# Patient Record
Sex: MALE | Race: OTHER | NOT HISPANIC OR LATINO | Employment: FULL TIME | URBAN - METROPOLITAN AREA
[De-identification: names, ages, dates, MRNs, and addresses within clinical notes are randomized per-mention and may not be internally consistent; named-entity substitution may affect disease eponyms.]

---

## 2024-07-11 ENCOUNTER — HOSPITAL ENCOUNTER (EMERGENCY)
Facility: HOSPITAL | Age: 29
Discharge: HOME | End: 2024-07-11

## 2024-07-11 ENCOUNTER — HOSPITAL ENCOUNTER (EMERGENCY)
Facility: HOSPITAL | Age: 29
Discharge: HOME | End: 2024-07-12

## 2024-07-11 VITALS
TEMPERATURE: 97.9 F | HEART RATE: 60 BPM | RESPIRATION RATE: 18 BRPM | HEIGHT: 67 IN | WEIGHT: 140 LBS | DIASTOLIC BLOOD PRESSURE: 77 MMHG | SYSTOLIC BLOOD PRESSURE: 158 MMHG | OXYGEN SATURATION: 100 % | BODY MASS INDEX: 21.97 KG/M2

## 2024-07-11 VITALS
BODY MASS INDEX: 21.97 KG/M2 | TEMPERATURE: 97.7 F | WEIGHT: 140 LBS | SYSTOLIC BLOOD PRESSURE: 174 MMHG | HEIGHT: 67 IN | HEART RATE: 74 BPM | RESPIRATION RATE: 16 BRPM | OXYGEN SATURATION: 99 % | DIASTOLIC BLOOD PRESSURE: 112 MMHG

## 2024-07-11 DIAGNOSIS — J02.9 VIRAL PHARYNGITIS: ICD-10-CM

## 2024-07-11 DIAGNOSIS — R03.0 ELEVATED BLOOD PRESSURE READING: ICD-10-CM

## 2024-07-11 DIAGNOSIS — R59.9 LYMPH NODE ENLARGEMENT: Primary | ICD-10-CM

## 2024-07-11 DIAGNOSIS — J02.9 ACUTE PHARYNGITIS, UNSPECIFIED ETIOLOGY: Primary | ICD-10-CM

## 2024-07-11 LAB
HETEROPH AB SERPLBLD QL IA.RAPID: NEGATIVE
S PYO DNA THROAT QL NAA+PROBE: NOT DETECTED

## 2024-07-11 PROCEDURE — 87651 STREP A DNA AMP PROBE: CPT

## 2024-07-11 PROCEDURE — 99283 EMERGENCY DEPT VISIT LOW MDM: CPT

## 2024-07-11 PROCEDURE — 36415 COLL VENOUS BLD VENIPUNCTURE: CPT

## 2024-07-11 PROCEDURE — 86308 HETEROPHILE ANTIBODY SCREEN: CPT

## 2024-07-11 RX ORDER — ACETAMINOPHEN 325 MG/1
650 TABLET ORAL ONCE
Status: COMPLETED | OUTPATIENT
Start: 2024-07-11 | End: 2024-07-11

## 2024-07-11 RX ADMIN — ACETAMINOPHEN 650 MG: 325 TABLET ORAL at 09:55

## 2024-07-11 ASSESSMENT — COLUMBIA-SUICIDE SEVERITY RATING SCALE - C-SSRS
6. HAVE YOU EVER DONE ANYTHING, STARTED TO DO ANYTHING, OR PREPARED TO DO ANYTHING TO END YOUR LIFE?: NO
2. HAVE YOU ACTUALLY HAD ANY THOUGHTS OF KILLING YOURSELF?: NO
1. IN THE PAST MONTH, HAVE YOU WISHED YOU WERE DEAD OR WISHED YOU COULD GO TO SLEEP AND NOT WAKE UP?: NO

## 2024-07-11 ASSESSMENT — PAIN DESCRIPTION - LOCATION: LOCATION: THROAT

## 2024-07-11 ASSESSMENT — PAIN SCALES - GENERAL: PAINLEVEL_OUTOF10: 10 - WORST POSSIBLE PAIN

## 2024-07-11 ASSESSMENT — LIFESTYLE VARIABLES
HAVE PEOPLE ANNOYED YOU BY CRITICIZING YOUR DRINKING: NO
TOTAL SCORE: 0
EVER HAD A DRINK FIRST THING IN THE MORNING TO STEADY YOUR NERVES TO GET RID OF A HANGOVER: NO
HAVE YOU EVER FELT YOU SHOULD CUT DOWN ON YOUR DRINKING: NO
EVER FELT BAD OR GUILTY ABOUT YOUR DRINKING: NO

## 2024-07-11 ASSESSMENT — PAIN DESCRIPTION - PAIN TYPE: TYPE: ACUTE PAIN

## 2024-07-11 ASSESSMENT — PAIN - FUNCTIONAL ASSESSMENT: PAIN_FUNCTIONAL_ASSESSMENT: 0-10

## 2024-07-11 NOTE — ED TRIAGE NOTES
PT COMES TO ER WITH C/O SWOLLEN GLAND IN RIGHT SIDE OF THROAT/NECK. PT STATES PAIN WHEN SWALLOWING. PT STATES MOTRIN HELPS PAIN AND SWELLING AND THEN IT COMES BACK WHEN MED WEARS OFF.

## 2024-07-11 NOTE — ED PROVIDER NOTES
HPI   Chief Complaint   Patient presents with    Swollen Glands       Hope is a 29-year-old male with no pertinent past medical history and up-to-date on childhood vaccinations presenting to the emergency department with concerns for a swollen gland.  Patient notes that several days ago, he believes he found a swollen lymph node on the right side of his neck.  Patient states that he has been feeling the lymph node over the past couple days to assess if the swelling has gone down.  He states that it has increased and would like it to be evaluated today.  This morning, patient did develop a sore throat.  He denies any dysphagia.  No cough, fever, chills, nausea, vomiting.  He denies any sick contacts.  He denies any recent weight loss, night sweats, or bruising.  Patient states that he is otherwise healthy.                        [place] Coma Scale Score: 15                     Patient History   No past medical history on file.  No past surgical history on file.  No family history on file.  Social History     Tobacco Use    Smoking status: Not on file    Smokeless tobacco: Not on file   Substance Use Topics    Alcohol use: Not on file    Drug use: Not on file       Physical Exam   ED Triage Vitals [07/11/24 0912]   Temperature Heart Rate Respirations BP   36.6 °C (97.9 °F) 64 16 (!) 194/104      Pulse Ox Temp Source Heart Rate Source Patient Position   100 % Temporal -- Sitting      BP Location FiO2 (%)     Right arm --       Physical Exam  HENT:      Nose: No rhinorrhea.      Mouth/Throat:      Mouth: Mucous membranes are moist.      Pharynx: Oropharynx is clear. Posterior oropharyngeal erythema present. No oropharyngeal exudate.      Comments: No trismus.  Posterior oropharyngeal erythema.  No exudates.  No uvular deviation.  Airway is patent.  Mallampati 1.  Eyes:      Extraocular Movements: Extraocular movements intact.   Neck:      Comments: On physical examination, there is no clear swelling of the neck.  On  palpation, there is an enlarged lymph node assessed in the right cervical lymph chain.  It is soft, mobile, and nontender.  Cardiovascular:      Rate and Rhythm: Normal rate and regular rhythm.      Pulses: Normal pulses.      Heart sounds: Normal heart sounds.   Pulmonary:      Effort: Pulmonary effort is normal.      Breath sounds: Normal breath sounds.   Abdominal:      General: Abdomen is flat.      Palpations: Abdomen is soft.   Musculoskeletal:      Cervical back: Normal range of motion. No rigidity.   Lymphadenopathy:      Cervical: Cervical adenopathy present.   Neurological:      Mental Status: He is alert and oriented to person, place, and time. Mental status is at baseline.         ED Course & MDM   ED Course as of 07/11/24 1303   Thu Jul 11, 2024   1102 Group A Strep PCR: Not Detected []   1231 Mononucleosis Screen: Negative []      ED Course User Index  [] Era Russell PA-C         Diagnoses as of 07/11/24 1303   Lymph node enlargement   Viral pharyngitis   Elevated blood pressure reading       Medical Decision Making  Panfilo is a 29-year-old male with no pertinent past medical history and up-to-date on childhood vaccinations presenting to the emergency department with concerns for a swollen gland.    Medical Decision Making: He did not appear ill or toxic.  Vital signs reviewed and stable.  Blood pressure is slightly elevated at 194/104, patient is denying any headache, dizziness, lightheadedness, chest pain, or shortness of breath.  No fever.  There is no swelling of the neck on visual examination.  On palpation there is enlarged lymph noticed this in the right, cervical lymph chain that is soft, mobile, and nontender.  Workup in the emergency room included PCR swab for strep and mononucleosis screen.  I did offer CT of the soft tissue neck to patient, but he does not want to pursue imaging at this time.  I did consider malignancy, but I have low suspicion as patient endorses no recent weight  loss, night sweats, or history of malignancy.  I suspect that his symptoms today are related to reactive lymphadenopathy.  Patient notes that he has been touching the area multiple times a day to keep reassessing it.  This is likely causing the area to be reactive and swollen.  I did recommend he follow-up with a primary care provider regarding his visit to the ED today.  He should discuss the swollen lymph node and his elevated blood pressure reading.  Instructed him to return if he develops any chest pain, shortness of breath, weight loss, night sweats, or worsening sore throat.  I went to go provide patient with discharge paperwork and he was not in the room.  There are no belongings there.  He is not in the bathroom.  Ultimately, patient left with treatment incomplete.     Differential diagnoses considered: Strep pharyngitis, viral pharyngitis, mononucleosis, reactive lymphadenopathy, etc.  Malignancy and peritonsillar abscess were considered in differentials.  Low suspicion for malignancy as patient does not have any weight loss, night sweats, or history concerning for such process.  Additionally, he is accompanied sore throat which is reassuring.  Suspicion for PTA low as there is no uvular deviation, trismus, or exudates.     Medications given: Tylenol     Diagnosis: Lymph node enlargement, viral pharyngitis, elevated blood pressure reading    Plan: Left with treatment incomplete          Procedure  Procedures     Era Russell PA-C  07/11/24 6381

## 2024-07-11 NOTE — Clinical Note
Panfilo Humphrey was seen and treated in our emergency department on 7/11/2024.  He may return to work on 07/13/2024.       If you have any questions or concerns, please don't hesitate to call.      Era Russell PA-C

## 2024-07-11 NOTE — DISCHARGE INSTRUCTIONS
You are seen emergency room today for concerns of enlarged lymph node and a sore throat.  We performed a PCR swab for strep which was negative.  We also did a monoscreen which was negative.  At this time, I believe that your symptoms may be due to a viral pharyngitis and/or reactive lymph node enlargement from you touching it frequently.  I recommend that you follow-up with a primary care provider regarding your visit to the ED today.  I provided you a referral to one.  You should return to the emergency department if you have any chest pain, shortness of breath, sudden weight loss, night sweats, or abdominal pain.  For your viral pharyngitis, I recommend supportive care including alternating Tylenol/ibuprofen, rest, hydration, hot tea with honey, and if you would like you can use over-the-counter Chloraseptic spray.

## 2024-07-12 PROCEDURE — 2500000001 HC RX 250 WO HCPCS SELF ADMINISTERED DRUGS (ALT 637 FOR MEDICARE OP): Performed by: PHYSICIAN ASSISTANT

## 2024-07-12 PROCEDURE — 2500000004 HC RX 250 GENERAL PHARMACY W/ HCPCS (ALT 636 FOR OP/ED): Performed by: PHYSICIAN ASSISTANT

## 2024-07-12 RX ORDER — AMOXICILLIN 500 MG/1
1000 CAPSULE ORAL EVERY 8 HOURS SCHEDULED
Status: DISCONTINUED | OUTPATIENT
Start: 2024-07-12 | End: 2024-07-12 | Stop reason: HOSPADM

## 2024-07-12 RX ORDER — AMOXICILLIN 500 MG/1
1000 CAPSULE ORAL EVERY 12 HOURS SCHEDULED
Qty: 40 CAPSULE | Refills: 0 | Status: SHIPPED | OUTPATIENT
Start: 2024-07-12 | End: 2024-07-22

## 2024-07-12 NOTE — ED TRIAGE NOTES
Patient was here earlier for throat pain for several days. Stated today it had gotten worse, was being evaluated earlier but had familial obligations and had to leave. Patient still having significant pain

## 2024-07-12 NOTE — DISCHARGE INSTRUCTIONS
Your blood pressure was found to be elevated.  This may be related to your discomfort however untreated high blood pressure could potentially result in heart or kidney disease.  You should closely monitor your blood pressure and follow-up accordingly with your care team

## 2024-07-12 NOTE — ED PROVIDER NOTES
HPI   Chief Complaint   Patient presents with    Sore Throat     Patient was here earlier for throat pain for several days. Stated today it had gotten worse, was being evaluated earlier but had familial obligations and had to leave. Patient still having significant pain       29-year-old male presents complaining of a sore throat.  The patient states discomfort for the past few days.  He was seen and evaluated earlier today at this facility.  That encounter the patient had a negative strep test along with a negative monotest.  Patient reports that he feels like his symptoms are worsening.  He also reports a swollen lymph node to the right side of his neck.  Denies any drooling or change in phonation.  He states that he can easily clear secretions without difficulty.  Report subjective fevers and chills.  Denies cough.                          [place] Coma Scale Score: 15                     Patient History   No past medical history on file.  No past surgical history on file.  No family history on file.  Social History     Tobacco Use    Smoking status: Not on file    Smokeless tobacco: Not on file   Substance Use Topics    Alcohol use: Not on file    Drug use: Not on file       Physical Exam   ED Triage Vitals [07/11/24 2343]   Temperature Heart Rate Respirations BP   36.5 °C (97.7 °F) 74 16 (!) 174/112      Pulse Ox Temp Source Heart Rate Source Patient Position   99 % Temporal Monitor Sitting      BP Location FiO2 (%)     Right arm --       Physical Exam  Vitals and nursing note reviewed.   Constitutional:       General: He is not in acute distress.     Appearance: Normal appearance. He is normal weight. He is not ill-appearing, toxic-appearing or diaphoretic.   HENT:      Head: Normocephalic.      Nose: Nose normal.      Mouth/Throat:      Mouth: Mucous membranes are moist.      Pharynx: Uvula midline.      Tonsils: No tonsillar exudate or tonsillar abscesses.      Comments: Erythema to the posterior  oropharynx  Eyes:      Extraocular Movements: Extraocular movements intact.      Conjunctiva/sclera: Conjunctivae normal.   Cardiovascular:      Rate and Rhythm: Normal rate and regular rhythm.      Pulses: Normal pulses.   Pulmonary:      Effort: Pulmonary effort is normal. No respiratory distress.      Breath sounds: Normal breath sounds.   Abdominal:      General: Abdomen is flat.   Musculoskeletal:         General: Normal range of motion.      Cervical back: Normal range of motion and neck supple.   Lymphadenopathy:      Cervical: Cervical adenopathy present.   Skin:     General: Skin is warm and dry.      Capillary Refill: Capillary refill takes less than 2 seconds.   Neurological:      General: No focal deficit present.      Mental Status: He is alert and oriented to person, place, and time.   Psychiatric:         Mood and Affect: Mood normal.         Behavior: Behavior normal.         Thought Content: Thought content normal.         Judgment: Judgment normal.         ED Course & MDM        Medical Decision Making  There is no trismus on exam.  Uvula midline without edema.  No evidence suggesting a peritonsillar abscess.  Patient was found to have anterior cervical lymphadenopathy on exam.  Given this along with absence of cough and subjective fevers I will be treating the patient with amoxicillin.  He was also provided with Decadron.  Recommended following up closely with his primary care physician.  Return precautions were discussed at length.        Procedure  Procedures     Moreno Mckeon PA-C  07/12/24 0006